# Patient Record
Sex: MALE | Race: WHITE | Employment: OTHER | URBAN - METROPOLITAN AREA
[De-identification: names, ages, dates, MRNs, and addresses within clinical notes are randomized per-mention and may not be internally consistent; named-entity substitution may affect disease eponyms.]

---

## 2023-02-10 ENCOUNTER — APPOINTMENT (OUTPATIENT)
Dept: GENERAL RADIOLOGY | Age: 33
End: 2023-02-10
Payer: COMMERCIAL

## 2023-02-10 ENCOUNTER — NURSE TRIAGE (OUTPATIENT)
Dept: OTHER | Facility: CLINIC | Age: 33
End: 2023-02-10

## 2023-02-10 ENCOUNTER — HOSPITAL ENCOUNTER (EMERGENCY)
Age: 33
Discharge: HOME | End: 2023-02-10
Attending: EMERGENCY MEDICINE
Payer: COMMERCIAL

## 2023-02-10 VITALS
BODY MASS INDEX: 32.51 KG/M2 | SYSTOLIC BLOOD PRESSURE: 116 MMHG | HEIGHT: 72 IN | HEART RATE: 63 BPM | OXYGEN SATURATION: 96 % | TEMPERATURE: 98.4 F | DIASTOLIC BLOOD PRESSURE: 73 MMHG | WEIGHT: 240 LBS | RESPIRATION RATE: 18 BRPM

## 2023-02-10 DIAGNOSIS — R07.89 CHEST TIGHTNESS: ICD-10-CM

## 2023-02-10 DIAGNOSIS — R00.2 PALPITATIONS: Primary | ICD-10-CM

## 2023-02-10 DIAGNOSIS — R51.9 ACUTE NONINTRACTABLE HEADACHE, UNSPECIFIED HEADACHE TYPE: ICD-10-CM

## 2023-02-10 DIAGNOSIS — R06.02 SHORTNESS OF BREATH: ICD-10-CM

## 2023-02-10 LAB
A/G RATIO: 1.6 (ref 1.1–2.2)
ALBUMIN SERPL-MCNC: 4.6 G/DL (ref 3.4–5)
ALP BLD-CCNC: 75 U/L (ref 40–129)
ALT SERPL-CCNC: 21 U/L (ref 10–40)
ANION GAP SERPL CALCULATED.3IONS-SCNC: 9 MMOL/L (ref 3–16)
APTT: 29.5 SEC (ref 23–34.3)
AST SERPL-CCNC: 16 U/L (ref 15–37)
BASOPHILS ABSOLUTE: 0.1 K/UL (ref 0–0.2)
BASOPHILS RELATIVE PERCENT: 2.2 %
BILIRUB SERPL-MCNC: 0.4 MG/DL (ref 0–1)
BUN BLDV-MCNC: 15 MG/DL (ref 7–20)
CALCIUM SERPL-MCNC: 9.7 MG/DL (ref 8.3–10.6)
CHLORIDE BLD-SCNC: 103 MMOL/L (ref 99–110)
CO2: 27 MMOL/L (ref 21–32)
CREAT SERPL-MCNC: 0.9 MG/DL (ref 0.9–1.3)
D DIMER: <0.27 UG/ML FEU (ref 0–0.6)
EKG ATRIAL RATE: 68 BPM
EKG DIAGNOSIS: NORMAL
EKG P AXIS: 52 DEGREES
EKG P-R INTERVAL: 150 MS
EKG Q-T INTERVAL: 396 MS
EKG QRS DURATION: 96 MS
EKG QTC CALCULATION (BAZETT): 421 MS
EKG R AXIS: 69 DEGREES
EKG T AXIS: 15 DEGREES
EKG VENTRICULAR RATE: 68 BPM
EOSINOPHILS ABSOLUTE: 0.1 K/UL (ref 0–0.6)
EOSINOPHILS RELATIVE PERCENT: 2.4 %
GFR SERPL CREATININE-BSD FRML MDRD: >60 ML/MIN/{1.73_M2}
GLUCOSE BLD-MCNC: 120 MG/DL (ref 70–99)
HCT VFR BLD CALC: 45.3 % (ref 40.5–52.5)
HEMOGLOBIN: 15.2 G/DL (ref 13.5–17.5)
INR BLD: 0.99 (ref 0.87–1.14)
LIPASE: 28 U/L (ref 13–60)
LYMPHOCYTES ABSOLUTE: 1.5 K/UL (ref 1–5.1)
LYMPHOCYTES RELATIVE PERCENT: 27.9 %
MCH RBC QN AUTO: 30.4 PG (ref 26–34)
MCHC RBC AUTO-ENTMCNC: 33.6 G/DL (ref 31–36)
MCV RBC AUTO: 90.5 FL (ref 80–100)
MONOCYTES ABSOLUTE: 0.3 K/UL (ref 0–1.3)
MONOCYTES RELATIVE PERCENT: 6.2 %
NEUTROPHILS ABSOLUTE: 3.2 K/UL (ref 1.7–7.7)
NEUTROPHILS RELATIVE PERCENT: 61.3 %
PDW BLD-RTO: 12.5 % (ref 12.4–15.4)
PLATELET # BLD: 257 K/UL (ref 135–450)
PMV BLD AUTO: 8.3 FL (ref 5–10.5)
POTASSIUM SERPL-SCNC: 4.3 MMOL/L (ref 3.5–5.1)
PRO-BNP: 88 PG/ML (ref 0–124)
PROTHROMBIN TIME: 13 SEC (ref 11.7–14.5)
RBC # BLD: 5.01 M/UL (ref 4.2–5.9)
SODIUM BLD-SCNC: 139 MMOL/L (ref 136–145)
TOTAL PROTEIN: 7.5 G/DL (ref 6.4–8.2)
TROPONIN: <0.01 NG/ML
WBC # BLD: 5.2 K/UL (ref 4–11)

## 2023-02-10 PROCEDURE — 99285 EMERGENCY DEPT VISIT HI MDM: CPT

## 2023-02-10 PROCEDURE — 93010 ELECTROCARDIOGRAM REPORT: CPT | Performed by: INTERNAL MEDICINE

## 2023-02-10 PROCEDURE — 83880 ASSAY OF NATRIURETIC PEPTIDE: CPT

## 2023-02-10 PROCEDURE — 85379 FIBRIN DEGRADATION QUANT: CPT

## 2023-02-10 PROCEDURE — 6360000002 HC RX W HCPCS: Performed by: PHYSICIAN ASSISTANT

## 2023-02-10 PROCEDURE — 93005 ELECTROCARDIOGRAM TRACING: CPT | Performed by: PHYSICIAN ASSISTANT

## 2023-02-10 PROCEDURE — 71045 X-RAY EXAM CHEST 1 VIEW: CPT

## 2023-02-10 PROCEDURE — 36415 COLL VENOUS BLD VENIPUNCTURE: CPT

## 2023-02-10 PROCEDURE — 83690 ASSAY OF LIPASE: CPT

## 2023-02-10 PROCEDURE — 2580000003 HC RX 258: Performed by: PHYSICIAN ASSISTANT

## 2023-02-10 PROCEDURE — 96374 THER/PROPH/DIAG INJ IV PUSH: CPT

## 2023-02-10 PROCEDURE — 84484 ASSAY OF TROPONIN QUANT: CPT

## 2023-02-10 PROCEDURE — 80053 COMPREHEN METABOLIC PANEL: CPT

## 2023-02-10 PROCEDURE — 85025 COMPLETE CBC W/AUTO DIFF WBC: CPT

## 2023-02-10 PROCEDURE — 85730 THROMBOPLASTIN TIME PARTIAL: CPT

## 2023-02-10 PROCEDURE — 85610 PROTHROMBIN TIME: CPT

## 2023-02-10 RX ORDER — HYDROXYZINE PAMOATE 25 MG/1
25 CAPSULE ORAL 3 TIMES DAILY PRN
Qty: 10 CAPSULE | Refills: 0 | Status: SHIPPED | OUTPATIENT
Start: 2023-02-10

## 2023-02-10 RX ORDER — KETOROLAC TROMETHAMINE 30 MG/ML
30 INJECTION, SOLUTION INTRAMUSCULAR; INTRAVENOUS ONCE
Status: COMPLETED | OUTPATIENT
Start: 2023-02-10 | End: 2023-02-10

## 2023-02-10 RX ORDER — 0.9 % SODIUM CHLORIDE 0.9 %
1000 INTRAVENOUS SOLUTION INTRAVENOUS ONCE
Status: COMPLETED | OUTPATIENT
Start: 2023-02-10 | End: 2023-02-10

## 2023-02-10 RX ADMIN — SODIUM CHLORIDE 1000 ML: 9 INJECTION, SOLUTION INTRAVENOUS at 14:08

## 2023-02-10 RX ADMIN — KETOROLAC TROMETHAMINE 30 MG: 30 INJECTION, SOLUTION INTRAMUSCULAR; INTRAVENOUS at 15:28

## 2023-02-10 ASSESSMENT — ENCOUNTER SYMPTOMS
ABDOMINAL PAIN: 0
COUGH: 0
WHEEZING: 0
NAUSEA: 0
VOMITING: 0
DIARRHEA: 0
SHORTNESS OF BREATH: 1
CHEST TIGHTNESS: 0

## 2023-02-10 ASSESSMENT — PAIN - FUNCTIONAL ASSESSMENT: PAIN_FUNCTIONAL_ASSESSMENT: 0-10

## 2023-02-10 ASSESSMENT — PAIN SCALES - GENERAL
PAINLEVEL_OUTOF10: 5
PAINLEVEL_OUTOF10: 4

## 2023-02-10 ASSESSMENT — PAIN DESCRIPTION - LOCATION: LOCATION: HEAD

## 2023-02-10 ASSESSMENT — HEART SCORE: ECG: 0

## 2023-02-10 NOTE — TELEPHONE ENCOUNTER
Location of patient: [place]    Received call from [facility] at [facility]; Patient with Red Flag Complaint requesting to establish care with new PCP at [facility]. Subjective: Caller states \"I've always got heart fluttering here and there. It usually lasted 20-30 and that's it. I went drinking on Monday and woke up Tuesday feeling like crap. My heart started fluttering. I usually drink a bunch of water, but it wouldn't stop. It did it all day Tuesday, all day Wednesday. \"     Current Symptoms: heart fluttering, HR 40's this morning- HR 80 NOW, fatigue, dizziness-NOT now, headache, body aches, chest tightness with deep breathing    Patient was seen at Urgent Care yesterday by MD; unable to do EKG. Patient was told it was likely stress related and to go to ED if happened again. Father has A-Fib    Onset: 3 days ago; worsening    Associated Symptoms: reduced activity, increased wakefulness    Pain Severity: 6/10; aching; headache constant    Temperature: Denies feeling feverish    What has been tried: Drinking water/Powerade    LMP: NA Pregnant: NA    Recommended disposition: Go to ED Now. Patient agreeable. Care advice provided, patient verbalizes understanding; denies any other questions or concerns; instructed to call back for any new or worsening symptoms. Patient/caller agrees to proceed to [facility] Emergency Department. Attention Provider: Thank you for allowing me to participate in the care of your patient. The patient was connected to triage in response to information provided to the [facility]. Please do not respond through this encounter as the response is not directed to a shared pool.     Reason for Disposition   Dizziness, lightheadedness, or weakness    Protocols used: Heart Rate and Heartbeat Questions-ADULT-[place]

## 2023-02-10 NOTE — ED PROVIDER NOTES
[facility]        Pt Name: Hakan Stoner  MRN: [id number]  [place] 1990  Date of evaluation: 2/10/2023  Provider: David Acevedo PA-C  PCP: No primary care provider on file. Note Started: 2:17 PM EST 2/10/23       I have seen and evaluated this patient with my supervising physician Nancy Burden MD.      [address]       Chief Complaint   Patient presents with    Illness     Pt reports heart fluttering that started on Tuesday. Pt reports that \"probably drank a little too much\" on Monday night and has had fluttering before after drinking. Pt reports fluttering continued until yesterday. Pt woke up this morning and had low HR in 40's and felt fatigued with body aches, headache/pressure. Pt denies cough, fevers. HISTORY OF PRESENT ILLNESS: 1 or more Elements     History from : Patient    Limitations to history : None    Hakan Stoner is a 28 y.o. male who presents to the emergency department today with several different complaints. Patient states he drank heavily on Monday evening. He states he woke up Tuesday morning and did experience some nausea and vomiting. He states around that time he began experiencing heart fluttering. He states he has had heart fluttering almost consistently since that time. He states starting yesterday he began getting chest pain, shortness of breath with exertion, fatigue, headache and even lightheadedness. He states his symptoms have improved today. He denies fevers, chills, recent illness or coughing. He has no GI complaints at this time. He denies recent travel, unilateral leg swelling or history of coagulopathy. He states his father does have atrial fibrillation but he has no known personal cardiac history. Nursing Notes were all reviewed and agreed with or any disagreements were addressed in the HPI.     REVIEW OF SYSTEMS :      Review of Systems   Constitutional: Positive for fatigue. Negative for chills and fever. Respiratory:  Positive for shortness of breath. Negative for cough, chest tightness and wheezing. Cardiovascular:  Positive for chest pain and palpitations. Negative for leg swelling. Gastrointestinal:  Negative for abdominal pain, diarrhea, nausea and vomiting. Genitourinary:  Negative for difficulty urinating, dysuria, flank pain, frequency and hematuria. Neurological:  Positive for light-headedness and headaches. Negative for tremors, seizures, syncope, facial asymmetry, speech difficulty, weakness and numbness. All other systems reviewed and are negative. Positives and Pertinent negatives as per HPI. SURGICAL HISTORY     Past Surgical History:   Procedure Laterality Date    MOUTH SURGERY         CURRENTMEDICATIONS       Previous Medications    No medications on file       ALLERGIES     Patient has no known allergies. FAMILYHISTORY     History reviewed. No pertinent family history. SOCIAL HISTORY       Social History     Tobacco Use    Smoking status: Never    Smokeless tobacco: Never   Substance Use Topics    Alcohol use: Yes     Comment: 3-4/per week    Drug use: Never       SCREENINGS                 Heart Score for chest pain patients  History: Slightly Suspicious  ECG: Normal  Patient Age: < 45 years  Risk Factors: No risk factors known  Troponin: < 1X normal limit  Heart Score Total: 0       CIWA Assessment  BP: 117/77  Heart Rate: 71           PHYSICAL EXAM  1 or more Elements     ED Triage Vitals [02/10/23 1307]   BP Temp Temp Source Heart Rate Resp SpO2 Height Weight   132/89 98.4 °F (36.9 °C) Oral 69 18 97 % 6' (1.829 m) 240 lb (108.9 kg)       Physical Exam  Vitals and nursing note reviewed. Constitutional:       Appearance: He is well-developed. He is not diaphoretic. HENT:      Head: Normocephalic and atraumatic.       Mouth/Throat:      Mouth: Mucous membranes are moist.   Eyes:      General:         Right eye: No discharge. Left eye: No discharge. Extraocular Movements: Extraocular movements intact. Conjunctiva/sclera: Conjunctivae normal.      Pupils: Pupils are equal, round, and reactive to light. Cardiovascular:      Rate and Rhythm: Normal rate and regular rhythm. Pulses: Normal pulses. Heart sounds: Normal heart sounds. Pulmonary:      Effort: Pulmonary effort is normal. No respiratory distress. Breath sounds: Normal breath sounds. Abdominal:      General: Abdomen is flat. Bowel sounds are normal. There is no distension. Palpations: Abdomen is soft. Tenderness: There is no abdominal tenderness. There is no guarding. Musculoskeletal:         General: Normal range of motion. Cervical back: Normal range of motion and neck supple. Skin:     General: Skin is warm and dry. Coloration: Skin is not pale. Neurological:      Mental Status: He is alert and oriented to person, place, and time. Psychiatric:         Behavior: Behavior normal.           DIAGNOSTIC RESULTS   LABS:    Labs Reviewed   COMPREHENSIVE METABOLIC PANEL - Abnormal; Notable for the following components:       Result Value    Glucose 120 (*)     All other components within normal limits   CBC WITH AUTO DIFFERENTIAL   LIPASE   TROPONIN   APTT   PROTIME-INR   BRAIN NATRIURETIC PEPTIDE   D-DIMER, QUANTITATIVE       When ordered only abnormal lab results are displayed. All other labs were within normal range or not returned as of this dictation. EKG: When ordered, EKG's are interpreted by the Emergency Department Physician in the absence of a cardiologist.  Please see their note for interpretation of EKG.     RADIOLOGY:   Non-plain film images such as CT, Ultrasound and MRI are read by the radiologist. Plain radiographic images are visualized and preliminarily interpreted by the ED Provider with the below findings:        Interpretation per the Radiologist below, if available at the time of this note:    XR CHEST PORTABLE   Final Result   No acute disease               PROCEDURES   Unless otherwise noted below, none     Procedures    CRITICAL CARE TIME (.cctime)       PAST MEDICAL HISTORY      has no past medical history on file. Chronic Conditions affecting Care: None    EMERGENCY DEPARTMENT COURSE and DIFFERENTIAL DIAGNOSIS/MDM:   Vitals:    Vitals:    02/10/23 1307 02/10/23 1408   BP: 132/89 117/77   Pulse: 69 71   Resp: 18 18   Temp: 98.4 °F (36.9 °C)    TempSrc: Oral    SpO2: 97% 97%   Weight: 240 lb (108.9 kg)    Height: 6' (1.829 m)        Patient was given the following medications:  Medications   0.9 % sodium chloride bolus (1,000 mLs IntraVENous New Bag 2/10/23 1408)   ketorolac (TORADOL) injection 30 mg (has no administration in time range)             Is this patient to be included in the SEP-1 Core Measure due to severe sepsis or septic shock? No   Exclusion criteria - the patient is NOT to be included for SEP-1 Core Measure due to: Infection is not suspected    CONSULTS: (Who and What was discussed)  None  Discussion with Other Profesionals : None    Social Determinants : None    Records Reviewed : None    CC/HPI Summary, DDx, ED Course, and Reassessment: Patient presented to the emergency department today stating he drank heavily on Monday. He states he woke up Tuesday morning with some nausea and vomiting and began having heart fluttering. He states this progressed into some chest tightness, shortness of breath with exertion, fatigue, headache and even lightheadedness. His EKG on arrival shows no signs of acute ischemia or infarction. Troponin, proBNP and D-dimer are all normal.  Chest x-ray shows no acute cardiopulmonary disease. CBC, BMP, LFTs, lipase and coags are unremarkable. Patient is given fluids here in the emergency department as well as Toradol.     Disposition Considerations (include 1 Tests not done, Shared Decision Making, Pt Expectation of Test or Tx.): On reexamination patient states he is feeling much better. He is normotensive without tachycardia, tachypnea or hypoxemia. He is nonfebrile and does not appear septic or ill. He has a heart score of 0. He did have a lengthy conversation with the attending physician regarding a lot of life changes and stressors that could be causing some anxiety. Patient will be discharged with Vistaril. He does not have a family doctor and is given a \"no PCP\" order. HEART Score ? 3 and 1 negative troponin - No hospitalization indicated    I completed a HEART Score to screen for Major Adverse Cardiac Event (MACE) in this patient. The evidence indicates that the patient is low risk for MACE and this is consistent with my clinical intuition. The risk of further workup or hospitalization for MACE is likely higher than the risk of the patient having a MACE. It is, therefore, in the patients best interest not to do additional emergent testing or to be hospitalized for MACE. I have discussed with the patient my clinical impression and the result of the HEART Score to screen for MACE, as well as the risks of further testing and hospitalization. The HEART Score shows that the risk for MACE is less than 2%      The patient has been evaluated and the history and physical exam suggest a benign etiology. I see nothing to suggest acute coronary syndrome, myocardial infarction, pulmonary embolism, thoracic aortic dissection, significant pericarditis, pneumonia, pneumothorax, or acute abdomen. I feel the patient can be safely discharged to home with outpatient follow up. Instructions have been given for the patient to return to the Emergency Department for any worsening of the symptoms, including but not limited to increased pain, shortness of breath, abdominal pain or weakness. Appropriate for outpatient management        I am the Primary Clinician of Record. FINAL IMPRESSION      1. Palpitations    2. Chest tightness    3. Shortness of breath    4.  Acute nonintractable headache, unspecified headache type          DISPOSITION/PLAN     DISPOSITION Decision To Discharge 02/10/2023 02:42:40 PM      PATIENT REFERRED TO:  [facility]) Pre-Services  [phone]  Schedule an appointment as soon as possible for a visit       [facility] Emergency Department  [address] [place] [address]  [phone]  Go to   If symptoms worsen    DISCHARGE MEDICATIONS:  New Prescriptions    HYDROXYZINE PAMOATE (VISTARIL) 25 MG CAPSULE    Take 1 capsule by mouth 3 times daily as needed for Anxiety       DISCONTINUED MEDICATIONS:  Discontinued Medications    No medications on file              (Please note that portions of this note were completed with a voice recognition program.  Efforts were made to edit the dictations but occasionally words are mis-transcribed.)    Nayeli Rios PA-C (electronically signed)           Nayeli Rios PA-C  02/10/23 1505

## 2023-02-10 NOTE — ED PROVIDER NOTES
I independently saw performed a substantive portion of the visit (history, physical, and MDM) on Kai Rainey. All diagnostic, treatment, and disposition decisions were made by myself in conjunction with the advanced practice provider. I have participated in the medical decision making and directed the treatment plan and disposition of the patient. For further details of [facility] emergency department encounter, please see the advanced practice provider's documentation. Charis Gandhi MD, am the primary physician provider of record. CHIEF COMPLAINT  Chief Complaint   Patient presents with    Illness     Pt reports heart fluttering that started on Tuesday. Pt reports that \"probably drank a little too much\" on Monday night and has had fluttering before after drinking. Pt reports fluttering continued until yesterday. Pt woke up this morning and had low HR in 40's and felt fatigued with body aches, headache/pressure. Pt denies cough, fevers. Briefly, Kai Rainey is a 28 y.o. male  who presents to the ED complaining of heart fluttering, onset Tuesday and persistent until Thursday. He actually does not have the symptoms today. 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 He denies any chest discomfort per se but does have a persistent fluttering feeling in the chest until today. He was seen at an urgent care who checked his vital signs however her EKG machine was not working so he did not get fully evaluated by those visits. He has had some fatigue and body aches. He has not had a cough or fever though. No history of similar symptoms prior to this week.     FOCUSED PHYSICAL EXAMINATION  /77   Pulse 71   Temp 98.4 °F (36.9 °C) (Oral)   Resp 18   Ht 6' (1.829 m)   Wt 240 lb (108.9 kg)   SpO2 97%   BMI 32.55 kg/m² Focused physical examination notable forno acute distress, well-appearing, well-nourished, normal speech and mentation without obvious facial droop, no obvious rash. No obvious cranial nerve deficits on my initial exam.  Regular rate and rhythm clear to auscultation bilaterally      EKG performed in ED:    The 12 lead EKG was interpreted by me independent of a cardiologist as follows:  Rate: normal with a rate of 68  Rhythm: sinus  Axis: normal  Intervals: normal OR, narrow QRS, normal QTc  ST segments: no ST elevations or depressions  T waves: no abnormal inversions  Non-specific T wave changes: not present  Prior EKG comparison: No prior is currently available for comparison          ED COURSE/MDM  Patient seen and evaluated. Old records reviewed. Labs and imaging reviewed. After initial evaluation, differential diagnostic considerations included but not limited to: acute coronary syndrome, pulmonary embolism, COPD/asthma, pneumonia, musculoskeletal, reflux/PUD/gastritis, pneumothorax, CHF, thoracic aortic dissection, anxiety      The patient's ED workup was notable for palpitations and chest fluttering sensation however EKG today is completely normal.  His vital signs are also completely reassuring including blood pressure and oxygenation. He has no infectious symptoms. No leukocytosis. Troponin negative, with normal EKG and symptoms not consistent with angina. PE was considered but he is low risk without tachycardia tachypnea or hypoxia and has a negative D-dimer to sufficiently rule this out and his chest x-ray is also clear. He has no signs or symptoms of heart failure and his BNP is 88. He does report a high level of stress and anxiety may be a contributing factor. I did recommend he follow-up with his PCP, he does not have 1 so a referral was made and he may consider outpatient diagnostic testing at their discretion such as thyroid studies or Holter monitoring if symptoms persist or recur. He will be started on a trial of as needed Vistaril for home as well      Is this patient to be included in the SEP-1 Core Measure? No   Exclusion criteria - the patient is NOT to be included for SEP-1 Core Measure due to: Infection is not suspected      Consults:  None    History obtained from: Patient    Pertinent social determinants of health: Does not have a PCP    Chronic conditions potentially affecting care: None applicable    Review of other records:  None    Reassessment:  See [facility] for details of medications given and reassessment    During the patient's ED course, the patient was given:  Medications   0.9 % sodium chloride bolus (1,000 mLs IntraVENous New Bag 2/10/23 1408)        CLINICAL IMPRESSION  1. Palpitations        Blood pressure 117/77, pulse 71, temperature 98.4 °F (36.9 °C), temperature source Oral, resp. rate 18, height 6' (1.829 m), weight 240 lb (108.9 kg), SpO2 97 %. Jazzmine Alarcon was discharged in stable condition. I have discussed the findings of today's workup with the patient and addressed the patient's questions and concerns. Important warning signs as well as new or worsening symptoms which would necessitate immediate return to the ED were discussed. The plan is to discharge from the ED at this time, and the patient is in stable condition. The patient acknowledged understanding is agreeable with this plan. Patient was given scripts for the following medications. I counseled patient how to take these medications. New Prescriptions    HYDROXYZINE PAMOATE (VISTARIL) 25 MG CAPSULE    Take 1 capsule by mouth 3 times daily as needed for Anxiety       Follow-up with:  [facility]) Pre-Services  [place] [address] Emergency Department  [address]  [facility]  [phone]  Go to   If symptoms worsen    Critical care time:  None    DISCLAIMER: This chart was created using Dragon dictation software.   Efforts were made by me to ensure accuracy, however some errors may be present due to limitations of this technology and occasionally words are not transcribed correctly.            Adrienne Sanabria MD  02/10/23 0509